# Patient Record
Sex: MALE | Race: WHITE | NOT HISPANIC OR LATINO | Employment: FULL TIME | URBAN - METROPOLITAN AREA
[De-identification: names, ages, dates, MRNs, and addresses within clinical notes are randomized per-mention and may not be internally consistent; named-entity substitution may affect disease eponyms.]

---

## 2022-12-13 ENCOUNTER — APPOINTMENT (OUTPATIENT)
Dept: RADIOLOGY | Facility: MEDICAL CENTER | Age: 22
End: 2022-12-13
Attending: EMERGENCY MEDICINE
Payer: COMMERCIAL

## 2022-12-13 ENCOUNTER — HOSPITAL ENCOUNTER (EMERGENCY)
Facility: MEDICAL CENTER | Age: 22
End: 2022-12-13
Attending: EMERGENCY MEDICINE
Payer: COMMERCIAL

## 2022-12-13 VITALS
HEART RATE: 76 BPM | DIASTOLIC BLOOD PRESSURE: 69 MMHG | OXYGEN SATURATION: 97 % | TEMPERATURE: 98 F | WEIGHT: 292.55 LBS | RESPIRATION RATE: 16 BRPM | HEIGHT: 72 IN | SYSTOLIC BLOOD PRESSURE: 128 MMHG | BODY MASS INDEX: 39.62 KG/M2

## 2022-12-13 DIAGNOSIS — R07.89 CHEST WALL PAIN: ICD-10-CM

## 2022-12-13 LAB
D DIMER PPP IA.FEU-MCNC: <0.27 UG/ML (FEU) (ref 0–0.5)
EKG IMPRESSION: NORMAL
TROPONIN T SERPL-MCNC: <6 NG/L (ref 6–19)

## 2022-12-13 PROCEDURE — 85379 FIBRIN DEGRADATION QUANT: CPT

## 2022-12-13 PROCEDURE — 84484 ASSAY OF TROPONIN QUANT: CPT

## 2022-12-13 PROCEDURE — 99285 EMERGENCY DEPT VISIT HI MDM: CPT

## 2022-12-13 PROCEDURE — 700102 HCHG RX REV CODE 250 W/ 637 OVERRIDE(OP): Performed by: EMERGENCY MEDICINE

## 2022-12-13 PROCEDURE — 93005 ELECTROCARDIOGRAM TRACING: CPT | Performed by: EMERGENCY MEDICINE

## 2022-12-13 PROCEDURE — 71045 X-RAY EXAM CHEST 1 VIEW: CPT

## 2022-12-13 PROCEDURE — 36415 COLL VENOUS BLD VENIPUNCTURE: CPT

## 2022-12-13 PROCEDURE — A9270 NON-COVERED ITEM OR SERVICE: HCPCS | Performed by: EMERGENCY MEDICINE

## 2022-12-13 PROCEDURE — 93005 ELECTROCARDIOGRAM TRACING: CPT

## 2022-12-13 RX ORDER — IBUPROFEN 600 MG/1
600 TABLET ORAL ONCE
Status: COMPLETED | OUTPATIENT
Start: 2022-12-13 | End: 2022-12-13

## 2022-12-13 RX ADMIN — IBUPROFEN 600 MG: 600 TABLET, FILM COATED ORAL at 06:28

## 2022-12-13 NOTE — ED NOTES
Patient ambulatory to [facility]  for triage complaint. Pt placed on monitor. Pt reports pain is 3/10 at this time

## 2022-12-13 NOTE — ED NOTES
Pt stable for discharge. Pt reviewed and educated on discharge instructions with RN. Pt verbalized understanding, all questions answered. Pt encouraged to come back if symptoms worsen. Pt ambulated independently with balanced and steady gait with all belongings.

## 2022-12-13 NOTE — ED TRIAGE NOTES
Chief Complaint   Patient presents with    Chest Pain     Substernal chest pain x3 ; non-radiating     Pt ambulatory to triage for above complaint. Pt denies injury or strenuous activity recently. Pt also denies SOB and N/V/D. No cardiac hx. EKG ordered per protocol.    Pt is alert/oriented and follows commands. Pt speaking in full sentences and responds appropriately to questions. No acute distress noted in triage and respirations are even and unlabored.     Pt placed in lobby and educated on triage process. Pt encouraged to alert staff for any changes in condition.

## 2022-12-13 NOTE — ED PROVIDER NOTES
"ED Provider Note    CHIEF COMPLAINT  Chief Complaint   Patient presents with    Chest Pain     Substernal chest pain x3 ; non-radiating       HPI  Liu Paniagua is a 22 y.o. male who presents with chest pain.  Patient was at work at [employer].  He had rather sudden onset of sharp right-sided anterior chest pain.  Pain has been constant since its onset.  Worsened with movement or deep breath.  Not exertional.  Not positional.  No pain with movement of his arm.  Denies any injury.  Pain does not radiate.  No tearing pain or back pain.  He has not had cough, fever chills congestion runny nose sore throat.  No hemoptysis.  No leg swelling travel immobilization surgery.  Denies abdominal pain nausea vomiting.  Has not had a fever.    REVIEW OF SYSTEMS  As per HPI, otherwise a 10 point review of systems is negative    PAST MEDICAL HISTORY  Past Medical History:   Diagnosis Date    Asthma     Broken leg     right leg        SOCIAL HISTORY  Social History     Tobacco Use    Smoking status: Former     Types: Cigarettes    Smokeless tobacco: Never   Vaping Use    Vaping Use: Former   Substance Use Topics    Alcohol use: Yes     Comment: \"maybe like one or two shots every couple months\"    Drug use: Yes     Types: Inhaled     Comment: marijuana       SURGICAL HISTORY  History reviewed. No pertinent surgical history.    CURRENT MEDICATIONS  Home Medications       Reviewed by Elizabeth Hensley R.N. (Registered Nurse) on 12/13/22 at 0523  Med List Status: Not Addressed     Medication Last Dose Status   ALBUTEROL INH  Active                    ALLERGIES  No Known Allergies    PHYSICAL EXAM  VITAL SIGNS: /79   Pulse 91   Temp 36.4 °C (97.5 °F) (Temporal)   Resp 18   Ht 1.829 m (6')   Wt (!) 133 kg (292 lb 8.8 oz)   SpO2 94%   BMI 39.68 kg/m²    Constitutional: Awake and alert  HENT: Normal inspection  Eyes: Normal inspection  Neck: Grossly normal range of motion.  Cardiovascular: Mildly elevated heart rate, Normal rhythm.  " Symmetric peripheral pulses.   Thorax & Lungs: No respiratory distress, No wheezing, No rales, No rhonchi, right anterior chest wall tenderness  Abdomen: Bowel sounds normal, soft, non-distended, nontender, no mass  Skin: No obvious rash.  Back: No tenderness, No CVA tenderness.   Extremities: No clubbing, cyanosis, edema, no Homans or cords.  Neurologic: Grossly normal   Psychiatric: Normal for situation    RADIOLOGY/PROCEDURES  DX-CHEST-PORTABLE (1 VIEW)   Final Result         1.  No acute cardiopulmonary disease.           Imaging is interpreted by radiologist    Labs:  Results for orders placed or performed during the hospital encounter of 22   D-DIMER   Result Value Ref Range    D-Dimer Screen <0.27 0.00 - 0.50 ug/mL (FEU)   TROPONIN   Result Value Ref Range    Troponin T <6 6 - 19 ng/L   EKG   Result Value Ref Range    Report       [facility]    Test Date:  2022  Pt Name:    ADIN LE                 Department: ER  MRN:        [id number]                      Room:  Gender:     Male                         Technician: [id number]  :        2000                   Requested By:ER TRIAGE PROTOCOL  Order #:    [id number]                    Reading MD: ULICES LORENZANA MD    Measurements  Intervals                                Axis  Rate:       81                           P:          18  VT:         115                          QRS:        -5  QRSD:       103                          T:          23  QT:         366  QTc:        425    Interpretive Statements  Sinus rhythm  Baseline wander in lead(s) II,aVR,aVF,V3  No previous ECG available for comparison  Electronically Signed On 2022 5:59:53 PST by ULICES LORENZANA MD         Medications   ibuprofen (MOTRIN) tablet 600 mg (has no administration in time range)     Low risk heart score  COURSE & MEDICAL DECISION MAKING  Patient presents with chest pain.  EKG without ischemia.  This appears to be reproducible  on his chest wall.  Cannot clear PE by PERC criteria.  Obtain D-dimer.  Obtain chest x-ray and troponin.    Laboratory data and chest x-ray returned negative.  Patient was given ibuprofen in the ER.  I advised NSAIDs and rest.  Patient to return to the ER for any difficulty breathing, worsening, not improving or concern.  Follow-up with primary provider for recheck.    FINAL IMPRESSION  1.  Chest pain, suspected chest wall etiology      This dictation was created using voice recognition software. The accuracy of the dictation is limited to the abilities of the software.  The nursing notes were reviewed and certain aspects of this information were incorporated into this note.      Electronically signed by: Jakob Swanson M.D., 12/13/2022 6:13 AM

## 2024-05-02 ENCOUNTER — OFFICE VISIT (OUTPATIENT)
Dept: URGENT CARE | Facility: CLINIC | Age: 24
End: 2024-05-02
Payer: COMMERCIAL

## 2024-05-02 ENCOUNTER — HOSPITAL ENCOUNTER (EMERGENCY)
Facility: MEDICAL CENTER | Age: 24
End: 2024-05-02
Payer: COMMERCIAL

## 2024-05-02 VITALS
WEIGHT: 263.2 LBS | OXYGEN SATURATION: 96 % | RESPIRATION RATE: 14 BRPM | TEMPERATURE: 97.8 F | HEART RATE: 79 BPM | DIASTOLIC BLOOD PRESSURE: 84 MMHG | BODY MASS INDEX: 35.65 KG/M2 | HEIGHT: 72 IN | SYSTOLIC BLOOD PRESSURE: 126 MMHG

## 2024-05-02 DIAGNOSIS — Z02.89 ENCOUNTER FOR PHYSICAL EXAMINATION RELATED TO EMPLOYMENT: ICD-10-CM

## 2024-05-02 PROCEDURE — 3079F DIAST BP 80-89 MM HG: CPT | Performed by: PHYSICIAN ASSISTANT

## 2024-05-02 PROCEDURE — 99202 OFFICE O/P NEW SF 15 MIN: CPT | Performed by: PHYSICIAN ASSISTANT

## 2024-05-02 PROCEDURE — 3074F SYST BP LT 130 MM HG: CPT | Performed by: PHYSICIAN ASSISTANT
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
